# Patient Record
Sex: MALE | HISPANIC OR LATINO | Employment: FULL TIME | URBAN - METROPOLITAN AREA
[De-identification: names, ages, dates, MRNs, and addresses within clinical notes are randomized per-mention and may not be internally consistent; named-entity substitution may affect disease eponyms.]

---
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
## 2023-10-10 ENCOUNTER — APPOINTMENT (OUTPATIENT)
Dept: URGENT CARE | Facility: CLINIC | Age: 23
End: 2023-10-10

## 2023-10-10 ENCOUNTER — OFFICE VISIT (OUTPATIENT)
Dept: URGENT CARE | Facility: CLINIC | Age: 23
End: 2023-10-10
Payer: COMMERCIAL

## 2023-10-10 VITALS
HEART RATE: 100 BPM | RESPIRATION RATE: 16 BRPM | BODY MASS INDEX: 26.05 KG/M2 | OXYGEN SATURATION: 95 % | DIASTOLIC BLOOD PRESSURE: 60 MMHG | WEIGHT: 182 LBS | SYSTOLIC BLOOD PRESSURE: 110 MMHG | HEIGHT: 70 IN | TEMPERATURE: 98.3 F

## 2023-10-10 DIAGNOSIS — H10.33 ACUTE CONJUNCTIVITIS OF BOTH EYES, UNSPECIFIED ACUTE CONJUNCTIVITIS TYPE: ICD-10-CM

## 2023-10-10 PROCEDURE — 3074F SYST BP LT 130 MM HG: CPT | Performed by: STUDENT IN AN ORGANIZED HEALTH CARE EDUCATION/TRAINING PROGRAM

## 2023-10-10 PROCEDURE — 3078F DIAST BP <80 MM HG: CPT | Performed by: STUDENT IN AN ORGANIZED HEALTH CARE EDUCATION/TRAINING PROGRAM

## 2023-10-10 PROCEDURE — 99203 OFFICE O/P NEW LOW 30 MIN: CPT | Performed by: STUDENT IN AN ORGANIZED HEALTH CARE EDUCATION/TRAINING PROGRAM

## 2023-10-10 RX ORDER — FLUTICASONE PROPIONATE 50 MCG
2 SPRAY, SUSPENSION (ML) NASAL
Qty: 16 G | Refills: 1 | Status: SHIPPED | OUTPATIENT
Start: 2023-10-10

## 2023-10-10 RX ORDER — METHYLPREDNISOLONE 4 MG/1
TABLET ORAL
Qty: 21 TABLET | Refills: 0 | Status: SHIPPED | OUTPATIENT
Start: 2023-10-10

## 2023-10-10 RX ORDER — FEXOFENADINE HCL 60 MG/1
60 TABLET, FILM COATED ORAL DAILY
COMMUNITY

## 2023-10-10 RX ORDER — POLYMYXIN B SULFATE AND TRIMETHOPRIM 1; 10000 MG/ML; [USP'U]/ML
SOLUTION OPHTHALMIC
Qty: 10 ML | Refills: 0 | Status: SHIPPED | OUTPATIENT
Start: 2023-10-10

## 2023-10-10 NOTE — PROGRESS NOTES
"Subjective:   CHIEF COMPLAINT  Chief Complaint   Patient presents with    Red Eye     Seasonal allergies x 2 weeks, bilateral eyes become red, concerned about a \"bacterial infection\", eye drainage in the mornings        [facility]  Ulysses Weston Proctor is a 23 y.o. male who presents with a chief complaint of bilateral eye pruritus and redness.  Symptoms started 2 weeks ago.  Reports there is drainage in the morning and his eyes are matted shut.  Says symptoms are aggravated with itching/rubbing of his eyes.  He has tried taking Allegra which provides transient relief of symptoms.  Positive ROS for cough and congestion.  He is not experiencing any eye pain.  No photophobia.  No nausea or vomiting.  History of similar symptoms which he believes are due to seasonal allergies.  No history of asthma.    REVIEW OF SYSTEMS  General: no fever or chills  GI: no nausea or vomiting  See HPI for further details.    PAST MEDICAL HISTORY  There are no problems to display for this patient.      SURGICAL HISTORY  patient denies any surgical history    ALLERGIES  Allergies   Allergen Reactions    Seasonal        CURRENT MEDICATIONS  Home Medications       Reviewed by Srinivas Yepez D.O. (Physician) on 10/10/23 at 1619  Med List Status: <None>     Medication Last Dose Status   cyclobenzaprine (FLEXERIL) 10 MG Tab Not Taking Active   fexofenadine (ALLEGRA) 60 MG Tab Taking Active   ibuprofen (MOTRIN) 800 MG Tab Not Taking Active   multivitamin (THERAGRAN) Tab Not Taking Active                    SOCIAL HISTORY  Social History     Tobacco Use    Smoking status: Never    Smokeless tobacco: Never   Vaping Use    Vaping Use: Former    Substances: Nicotine   Substance and Sexual Activity    Alcohol use: No    Drug use: No    Sexual activity: Not on file       FAMILY HISTORY  History reviewed. No pertinent family history.       Objective:   PHYSICAL EXAM  VITAL SIGNS: /60 (BP Location: Left arm, Patient Position: Sitting, BP " "Cuff Size: Adult)   Pulse 100   Temp 36.8 °C (98.3 °F) (Temporal)   Resp 16   Ht 1.778 m (5' 10\")   Wt 82.6 kg (182 lb)   SpO2 95%   BMI 26.11 kg/m²     Gen: no acute distress, normal voice  Skin: dry, intact, moist mucosal membranes  Eye: EOMI and PERRLA b/l.  Mild conjunctival injection bilaterally.  No chemosis.  No photophobia with penlight test.  Scant, dried purulent discharge along the left lower eyelash.  No surrounding erythema or edema of eye lids.  No pain with extraocular eye movement.  Neck: Normal range of motion. No meningeal signs.   Lungs: No increased work of breathing.  CTAB w/ symmetric expansion  CV: RRR w/o murmurs or clicks  Psych: normal affect, normal judgement, alert, awake    Assessment/Plan:     1. Acute conjunctivitis of both eyes, unspecified acute conjunctivitis type  polymixin-trimethoprim (POLYTRIM) 43142-0.1 UNIT/ML-% Solution    Referral back to [facility] PCP    methylPREDNISolone (MEDROL DOSEPAK) 4 MG Tablet Therapy Pack    fluticasone (FLONASE) 50 MCG/ACT nasal spray      Suspect secondary to allergies.  -Ordered Medrol Dosepak.  Side effects discussed  -Ordered Flonase.  -Continue daily Allegra  -If no improvement within 48-72 hours instructed begin Polytrim  -Ordered referral to establish primary care  -Return to urgent care any new/worsening symptoms or further questions or concerns.  Patient understood everything discussed.  All questions were answered.      Differential diagnosis and supportive care discussed. Follow-up as needed if symptoms worsen or fail to improve to PCP, Urgent care or Emergency Room.    Please note that this dictation was created using voice recognition software. I have made a reasonable attempt to correct obvious errors, but I expect that there are errors of grammar and possibly content that I did not discover before finalizing the note.         "

## 2023-10-16 ENCOUNTER — TELEPHONE (OUTPATIENT)
Dept: HEALTH INFORMATION MANAGEMENT | Facility: OTHER | Age: 23
End: 2023-10-16
Payer: COMMERCIAL

## 2023-11-27 ENCOUNTER — TELEPHONE (OUTPATIENT)
Dept: HEALTH INFORMATION MANAGEMENT | Facility: OTHER | Age: 23
End: 2023-11-27
Payer: COMMERCIAL